# Patient Record
Sex: MALE | Race: WHITE
[De-identification: names, ages, dates, MRNs, and addresses within clinical notes are randomized per-mention and may not be internally consistent; named-entity substitution may affect disease eponyms.]

---

## 2019-03-31 ENCOUNTER — HOSPITAL ENCOUNTER (EMERGENCY)
Dept: HOSPITAL 76 - ED | Age: 34
Discharge: HOME | End: 2019-03-31
Payer: COMMERCIAL

## 2019-03-31 VITALS — SYSTOLIC BLOOD PRESSURE: 121 MMHG | DIASTOLIC BLOOD PRESSURE: 85 MMHG

## 2019-03-31 DIAGNOSIS — M25.471: ICD-10-CM

## 2019-03-31 DIAGNOSIS — M25.571: Primary | ICD-10-CM

## 2019-03-31 PROCEDURE — 99283 EMERGENCY DEPT VISIT LOW MDM: CPT

## 2019-03-31 PROCEDURE — 73610 X-RAY EXAM OF ANKLE: CPT

## 2019-03-31 NOTE — XRAY REPORT
Reason:  pain with ambulation

Procedure Date:  03/31/2019   

Accession Number:  [id number] / [id number]                    

Procedure:  XR  - Ankle 3 View RT CPT Code:  

 

FULL RESULT:

 

 

EXAM:

RIGHT ANKLE RADIOGRAPHY

 

EXAM DATE: 3/31/2019 10:12 AM.

 

CLINICAL HISTORY: Right ankle pain with ambulation.

 

COMPARISON: None.

 

TECHNIQUE: 3 views.

 

FINDINGS:

Bones: Normal. No fractures or bone lesions.

 

Joints: Normal. No effusion. No subluxations. The ankle mortise is 

normally aligned.

 

Soft Tissues: Normal. No soft tissue swelling.

IMPRESSION: Normal ankle radiography.

 

[facility]

## 2019-03-31 NOTE — ED PHYSICIAN DOCUMENTATION
PD HPI LOWER EXT INJURY





- Stated complaint


Stated Complaint: RT ANKLE PAIN





- Chief complaint


Chief Complaint: Ext Problem





- History obtained from


History obtained from: Patient





- History of Present Illness


PD HPI LOW EXT INJURY LOCATION: Right, Ankle


Type of injury: Other (not aware of injury. Does do weight lifting and dead 

lifts, so pressure on ankles, but did not feel abrupt time of onset.).  No: 

Fall, Twist, Blunt / blow


Timing - onset: How many days ago (2)


Timing - duration: Days (2)


Timing - details: Gradual onset, Still present


Worsened by: Moving, Palpating


Associated symptoms: Swelling, Discolored (mild redness today).  No: Weakness, 

Numbness


Contributing factors: No: Prior ortho surgery


Similar symptoms before: Has not had sx before


Recently seen: Not recently seen (no recent dental work, skin abscesses, other 

problems.)





Review of Systems


Constitutional: denies: Fever


Nose: denies: Rhinorrhea / runny nose, Congestion


Throat: denies: Sore throat


Respiratory: denies: Cough


Skin: denies: Rash, Abrasion (s), Laceration (s)





PD PAST MEDICAL HISTORY





- Past Medical History


Past Medical History: No


Cardiovascular: None


Respiratory: None


Neuro: None


Endocrine/Autoimmune: None


GI: None


: None


HEENT: None


Psych: None


Musculoskeletal: None


Derm: None





- Past Surgical History


Past Surgical History: No





- Present Medications


Home Medications: 


                                Ambulatory Orders











 Medication  Instructions  Recorded  Confirmed


 


Dexamethasone [Decadron] 4 mg PO DAILY #5 tablet 03/31/19 


 


Hydrocodone/Acetaminophen [Norco 1 each PO Q6H PRN #15 tablet 03/31/19 





5-325 Tablet]   


 


Naproxen 500 mg PO BID #20 tablet 03/31/19 














- Allergies


Allergies/Adverse Reactions: 


                                    Allergies











Allergy/AdvReac Type Severity Reaction Status Date / Time


 


No Known Drug Allergies Allergy   Verified 03/31/19 09:56














- Social History


Does the pt smoke?: No


Smoking Status: Never smoker


Does the pt drink ETOH?: Yes


ETOH Use: Wine


Does the pt have substance abuse?: No





- Immunizations


Immunizations are current?: Yes





- POLST


Patient has POLST: No





PD ED PE NORMAL





- Vitals


Vital signs reviewed: Yes





- General


General: Alert and oriented X 3, No acute distress, Well developed/nourished





- Derm


Derm: Normal color, Warm and dry





- Extremities


Extremities: No edema, No calf tenderness / cord, Other (right ankle with medial

tenderness posterior and inferior to malleolus with some local swelling and 

redness. No skin sores. )





Results





- Vitals


Vitals: 


                               Vital Signs - 24 hr











  03/31/19 03/31/19





  09:54 11:43


 


Temperature 36.0 C L 36.9 C


 


Heart Rate 75 67


 


Respiratory 16 12





Rate  


 


Blood Pressure 133/80 H 121/85 H


 


O2 Saturation 100 98








                                     Oxygen











O2 Source                      Room air

















Departure





- Departure


Disposition: 01 Home, Self Care


Clinical Impression: 


Ankle pain, right


Qualifiers:


 Chronicity: acute Qualified Code(s): M25.571 - Pain in right ankle and joints 

of right foot





Condition: Stable


Record reviewed to determine appropriate education?: Yes


Instructions:  ED Joint Pain


Prescriptions: 


Dexamethasone [Decadron] 4 mg PO DAILY #5 tablet


Hydrocodone/Acetaminophen [Norco 5-325 Tablet] 1 each PO Q6H PRN #15 tablet


 PRN Reason: Pain


Naproxen 500 mg PO BID #20 tablet


Comments: 


Consider possibilities of some tendinitis around the ankle which is typically 

will slower onset and offset of inflammation (as compared to an acute strain).  

Other consideration would be an episodic gout inflammation.  There does not seem

to be enough fluid there to really aspirate it for better diagnosis and and a 

single episode would not necessarily mean any change in diet or added 

medications and so therefore is okay if we do not have a firm diagnosis.  It 

does not seem infectious at this time but recheck if you do have fevers, 

increasing redness, expanding areas of pain.





At this point use crutches for nonweightbearing or partial weightbearing until 

improved.  Use anti-inflammatories of naproxen and Decadron (nonsteroidal and 

steroidal).  Add Tylenol or hydrocodone as needed for pain.  This would be the 

treatment for strain, tendinitis, gout, etc.  No particular follow-up needed if 

you improve over several days and are better.


Discharge Date/Time: 03/31/19 11:51